# Patient Record
Sex: FEMALE | Race: WHITE | URBAN - METROPOLITAN AREA
[De-identification: names, ages, dates, MRNs, and addresses within clinical notes are randomized per-mention and may not be internally consistent; named-entity substitution may affect disease eponyms.]

---

## 2023-05-03 RX ORDER — CEFAZOLIN SODIUM 1 G/3ML
1 INJECTION, POWDER, FOR SOLUTION INTRAMUSCULAR; INTRAVENOUS ONCE
Status: CANCELLED | OUTPATIENT
Start: 2023-05-03 | End: 2023-05-03

## 2023-06-21 ENCOUNTER — ANESTHESIA (OUTPATIENT)
Dept: SURGERY | Facility: HOSPITAL | Age: 28
End: 2023-06-21
Payer: COMMERCIAL

## 2023-06-21 ENCOUNTER — HOSPITAL ENCOUNTER (OUTPATIENT)
Facility: HOSPITAL | Age: 28
Setting detail: HOSPITAL OUTPATIENT SURGERY
Discharge: HOME | End: 2023-06-21
Attending: OBSTETRICS & GYNECOLOGY | Admitting: OBSTETRICS & GYNECOLOGY
Payer: COMMERCIAL

## 2023-06-21 ENCOUNTER — ANESTHESIA EVENT (OUTPATIENT)
Dept: SURGERY | Facility: HOSPITAL | Age: 28
End: 2023-06-21
Payer: COMMERCIAL

## 2023-06-21 VITALS
RESPIRATION RATE: 18 BRPM | OXYGEN SATURATION: 100 % | DIASTOLIC BLOOD PRESSURE: 69 MMHG | SYSTOLIC BLOOD PRESSURE: 121 MMHG | HEART RATE: 57 BPM | HEIGHT: 66 IN | BODY MASS INDEX: 28.74 KG/M2 | TEMPERATURE: 98 F | WEIGHT: 178.81 LBS

## 2023-06-21 PROBLEM — N83.202 CYST OF LEFT OVARY: Status: ACTIVE | Noted: 2023-06-21

## 2023-06-21 PROCEDURE — 0UT14ZZ RESECTION OF LEFT OVARY, PERCUTANEOUS ENDOSCOPIC APPROACH: ICD-10-PCS | Performed by: OBSTETRICS & GYNECOLOGY

## 2023-06-21 PROCEDURE — 0UT64ZZ RESECTION OF LEFT FALLOPIAN TUBE, PERCUTANEOUS ENDOSCOPIC APPROACH: ICD-10-PCS | Performed by: OBSTETRICS & GYNECOLOGY

## 2023-06-21 RX ORDER — DIPHENHYDRAMINE HYDROCHLORIDE 50 MG/ML
12.5 INJECTION INTRAMUSCULAR; INTRAVENOUS AS NEEDED
Status: DISCONTINUED | OUTPATIENT
Start: 2023-06-21 | End: 2023-06-21

## 2023-06-21 RX ORDER — ACETAMINOPHEN 500 MG
1000 TABLET ORAL ONCE
Status: DISCONTINUED | OUTPATIENT
Start: 2023-06-21 | End: 2023-06-21 | Stop reason: HOSPADM

## 2023-06-21 RX ORDER — BUPIVACAINE HYDROCHLORIDE 5 MG/ML
INJECTION, SOLUTION EPIDURAL; INTRACAUDAL AS NEEDED
Status: DISCONTINUED | OUTPATIENT
Start: 2023-06-21 | End: 2023-06-21 | Stop reason: HOSPADM

## 2023-06-21 RX ORDER — NEOSTIGMINE METHYLSULFATE 1 MG/ML
INJECTION, SOLUTION INTRAVENOUS AS NEEDED
Status: DISCONTINUED | OUTPATIENT
Start: 2023-06-21 | End: 2023-06-21 | Stop reason: SURG

## 2023-06-21 RX ORDER — MIDAZOLAM HYDROCHLORIDE 1 MG/ML
INJECTION INTRAMUSCULAR; INTRAVENOUS AS NEEDED
Status: DISCONTINUED | OUTPATIENT
Start: 2023-06-21 | End: 2023-06-21 | Stop reason: SURG

## 2023-06-21 RX ORDER — HYDROCODONE BITARTRATE AND ACETAMINOPHEN 5; 325 MG/1; MG/1
1 TABLET ORAL ONCE AS NEEDED
Status: DISCONTINUED | OUTPATIENT
Start: 2023-06-21 | End: 2023-06-21

## 2023-06-21 RX ORDER — PROCHLORPERAZINE EDISYLATE 5 MG/ML
5 INJECTION INTRAMUSCULAR; INTRAVENOUS EVERY 8 HOURS PRN
Status: DISCONTINUED | OUTPATIENT
Start: 2023-06-21 | End: 2023-06-21

## 2023-06-21 RX ORDER — NALOXONE HYDROCHLORIDE 0.4 MG/ML
80 INJECTION, SOLUTION INTRAMUSCULAR; INTRAVENOUS; SUBCUTANEOUS AS NEEDED
Status: DISCONTINUED | OUTPATIENT
Start: 2023-06-21 | End: 2023-06-21

## 2023-06-21 RX ORDER — HYDROCODONE BITARTRATE AND ACETAMINOPHEN 5; 325 MG/1; MG/1
2 TABLET ORAL ONCE AS NEEDED
Status: DISCONTINUED | OUTPATIENT
Start: 2023-06-21 | End: 2023-06-21

## 2023-06-21 RX ORDER — HYDROCODONE BITARTRATE AND ACETAMINOPHEN 5; 325 MG/1; MG/1
1-2 TABLET ORAL EVERY 4 HOURS PRN
Qty: 15 TABLET | Refills: 0 | Status: SHIPPED | OUTPATIENT
Start: 2023-06-21

## 2023-06-21 RX ORDER — KETOROLAC TROMETHAMINE 30 MG/ML
INJECTION, SOLUTION INTRAMUSCULAR; INTRAVENOUS AS NEEDED
Status: DISCONTINUED | OUTPATIENT
Start: 2023-06-21 | End: 2023-06-21 | Stop reason: SURG

## 2023-06-21 RX ORDER — CEFAZOLIN SODIUM/WATER 2 G/20 ML
2 SYRINGE (ML) INTRAVENOUS ONCE
Status: COMPLETED | OUTPATIENT
Start: 2023-06-21 | End: 2023-06-21

## 2023-06-21 RX ORDER — HYDROMORPHONE HYDROCHLORIDE 1 MG/ML
0.2 INJECTION, SOLUTION INTRAMUSCULAR; INTRAVENOUS; SUBCUTANEOUS EVERY 5 MIN PRN
Status: DISCONTINUED | OUTPATIENT
Start: 2023-06-21 | End: 2023-06-21

## 2023-06-21 RX ORDER — LIDOCAINE HYDROCHLORIDE 10 MG/ML
INJECTION, SOLUTION EPIDURAL; INFILTRATION; INTRACAUDAL; PERINEURAL AS NEEDED
Status: DISCONTINUED | OUTPATIENT
Start: 2023-06-21 | End: 2023-06-21 | Stop reason: SURG

## 2023-06-21 RX ORDER — SODIUM CHLORIDE, SODIUM LACTATE, POTASSIUM CHLORIDE, CALCIUM CHLORIDE 600; 310; 30; 20 MG/100ML; MG/100ML; MG/100ML; MG/100ML
INJECTION, SOLUTION INTRAVENOUS CONTINUOUS
Status: DISCONTINUED | OUTPATIENT
Start: 2023-06-21 | End: 2023-06-21

## 2023-06-21 RX ORDER — DEXAMETHASONE SODIUM PHOSPHATE 4 MG/ML
VIAL (ML) INJECTION AS NEEDED
Status: DISCONTINUED | OUTPATIENT
Start: 2023-06-21 | End: 2023-06-21 | Stop reason: SURG

## 2023-06-21 RX ORDER — HYDROMORPHONE HYDROCHLORIDE 1 MG/ML
0.6 INJECTION, SOLUTION INTRAMUSCULAR; INTRAVENOUS; SUBCUTANEOUS EVERY 5 MIN PRN
Status: DISCONTINUED | OUTPATIENT
Start: 2023-06-21 | End: 2023-06-21

## 2023-06-21 RX ORDER — SCOLOPAMINE TRANSDERMAL SYSTEM 1 MG/1
1 PATCH, EXTENDED RELEASE TRANSDERMAL ONCE
Status: DISCONTINUED | OUTPATIENT
Start: 2023-06-21 | End: 2023-06-21

## 2023-06-21 RX ORDER — GLYCOPYRROLATE 0.2 MG/ML
INJECTION, SOLUTION INTRAMUSCULAR; INTRAVENOUS AS NEEDED
Status: DISCONTINUED | OUTPATIENT
Start: 2023-06-21 | End: 2023-06-21 | Stop reason: SURG

## 2023-06-21 RX ORDER — HYDROMORPHONE HYDROCHLORIDE 1 MG/ML
0.4 INJECTION, SOLUTION INTRAMUSCULAR; INTRAVENOUS; SUBCUTANEOUS EVERY 5 MIN PRN
Status: DISCONTINUED | OUTPATIENT
Start: 2023-06-21 | End: 2023-06-21

## 2023-06-21 RX ORDER — ONDANSETRON 2 MG/ML
INJECTION INTRAMUSCULAR; INTRAVENOUS
Status: COMPLETED
Start: 2023-06-21 | End: 2023-06-21

## 2023-06-21 RX ORDER — ACETAMINOPHEN 500 MG
1000 TABLET ORAL ONCE AS NEEDED
Status: DISCONTINUED | OUTPATIENT
Start: 2023-06-21 | End: 2023-06-21

## 2023-06-21 RX ORDER — ROCURONIUM BROMIDE 10 MG/ML
INJECTION, SOLUTION INTRAVENOUS AS NEEDED
Status: DISCONTINUED | OUTPATIENT
Start: 2023-06-21 | End: 2023-06-21 | Stop reason: SURG

## 2023-06-21 RX ORDER — ONDANSETRON 2 MG/ML
4 INJECTION INTRAMUSCULAR; INTRAVENOUS EVERY 6 HOURS PRN
Status: DISCONTINUED | OUTPATIENT
Start: 2023-06-21 | End: 2023-06-21

## 2023-06-21 RX ADMIN — LIDOCAINE HYDROCHLORIDE 50 MG: 10 INJECTION, SOLUTION EPIDURAL; INFILTRATION; INTRACAUDAL; PERINEURAL at 07:45:00

## 2023-06-21 RX ADMIN — NEOSTIGMINE METHYLSULFATE 3 MG: 1 INJECTION, SOLUTION INTRAVENOUS at 09:03:00

## 2023-06-21 RX ADMIN — SODIUM CHLORIDE, SODIUM LACTATE, POTASSIUM CHLORIDE, CALCIUM CHLORIDE: 600; 310; 30; 20 INJECTION, SOLUTION INTRAVENOUS at 07:38:00

## 2023-06-21 RX ADMIN — MIDAZOLAM HYDROCHLORIDE 2 MG: 1 INJECTION INTRAMUSCULAR; INTRAVENOUS at 07:38:00

## 2023-06-21 RX ADMIN — ROCURONIUM BROMIDE 40 MG: 10 INJECTION, SOLUTION INTRAVENOUS at 07:45:00

## 2023-06-21 RX ADMIN — GLYCOPYRROLATE 0.15 MG: 0.2 INJECTION, SOLUTION INTRAMUSCULAR; INTRAVENOUS at 08:12:00

## 2023-06-21 RX ADMIN — KETOROLAC TROMETHAMINE 30 MG: 30 INJECTION, SOLUTION INTRAMUSCULAR; INTRAVENOUS at 08:50:00

## 2023-06-21 RX ADMIN — DEXAMETHASONE SODIUM PHOSPHATE 4 MG: 4 MG/ML VIAL (ML) INJECTION at 07:45:00

## 2023-06-21 RX ADMIN — SODIUM CHLORIDE, SODIUM LACTATE, POTASSIUM CHLORIDE, CALCIUM CHLORIDE: 600; 310; 30; 20 INJECTION, SOLUTION INTRAVENOUS at 08:57:00

## 2023-06-21 RX ADMIN — CEFAZOLIN SODIUM/WATER 2 G: 2 G/20 ML SYRINGE (ML) INTRAVENOUS at 07:52:00

## 2023-06-21 RX ADMIN — GLYCOPYRROLATE 0.4 MG: 0.2 INJECTION, SOLUTION INTRAMUSCULAR; INTRAVENOUS at 09:03:00

## 2023-06-21 RX ADMIN — ROCURONIUM BROMIDE 10 MG: 10 INJECTION, SOLUTION INTRAVENOUS at 08:30:00

## 2023-06-21 NOTE — OR NURSING
Patient up to bathroom with no issues. Upon discharge parient had no pain or nausea.  Scant discharge on peripad

## 2023-06-21 NOTE — ANESTHESIA PROCEDURE NOTES
Airway  Date/Time: 6/21/2023 7:49 AM  Urgency: elective    Airway not difficult    General Information and Staff    Patient location during procedure: OR  Anesthesiologist: Vidal Zacarias MD  Resident/CRNA: Elder Fang CRNA  Performed: CRNA   Performed by: Elder Fang CRNA  Authorized by: Vidal Zacarias MD      Indications and Patient Condition  Indications for airway management: anesthesia  Spontaneous Ventilation: absent  Sedation level: deep  Preoxygenated: yes  Patient position: sniffing  Mask difficulty assessment: 1 - vent by mask    Final Airway Details  Final airway type: endotracheal airway      Successful airway: ETT  Cuffed: yes   Successful intubation technique: direct laryngoscopy  Endotracheal tube insertion site: oral  Blade: Jerry  Blade size: #3  ETT size (mm): 7.0    Cormack-Lehane Classification: grade I - full view of glottis  Placement verified by: capnometry   Measured from: lips  ETT to lips (cm): 21  Number of attempts at approach: 1 Patient called back stating that she has been laying there for about 1 1/2 hours and can not fall asleep.  Patient states she keeps getting thoughts entering her head like what if she didn't go to the hospital that night, what if she got the morphine, what if she didn't use the penicillin, what if she stayed on the penicillin.  Patient states she mind can not stop with all of the what if's.  She doesn't know how to let it go.

## 2023-06-21 NOTE — DISCHARGE SUMMARY
[facility]  Discharge Summary    Sisi De Leon Patient Status:  Hospital Outpatient Surgery    12/15/1995 MRN [id number]   Location [facility] Attending Alex Guerra MD   Hosp Day # 0 PCP Kim Flores MD     Date of Admission: 2023    Date of Discharge: 2023    Admitting Diagnosis: LEFT ADNEXAL MASS    Discharge Diagnosis: Patient Active Problem List:     Migraine headache without aura     Cyst of left ovary      Reason for Admission: Left ovarian cyst            Hospital Course: unremarkable        Procedures: Laparoscopy with  LSO    Complications: none    Disposition: Final discharge disposition not confirmed    Discharge Condition: Stable  Diet:  General  Activity:  As tolerated    Discharge Medications: Current Discharge Medication List    START taking these medications    HYDROcodone-acetaminophen 5-325 MG Oral Tab  Take 1-2 tablets by mouth every 4 (four) hours as needed for Pain. Qty: 15 tablet Refills: 0      CONTINUE these medications which have NOT CHANGED    drospirenone-ethinyl estradiol 3-0.03 MG Oral Tab  Take 1 tablet by mouth daily.   Qty: 84 tablet Refills: 3  Associated Diagnoses:Surveillance for birth control, oral contraceptives                      Narinder Olson MD  2023  9:20 AM
You can access the FollowMyHealth Patient Portal offered by [facility] by registering at the following website: [email or web address]. By joining [facility]’s FollowMyHealth portal, you will also be able to view your health information using other applications (apps) compatible with our system.

## 2024-12-19 ENCOUNTER — EMPLOYEE HEALTH (OUTPATIENT)
Dept: OTHER | Facility: HOSPITAL | Age: 29
End: 2024-12-19
Attending: PREVENTIVE MEDICINE

## 2024-12-19 DIAGNOSIS — Z11.1 SCREENING-PULMONARY TB: Primary | ICD-10-CM

## 2024-12-19 DIAGNOSIS — Z01.84 IMMUNITY STATUS TESTING: ICD-10-CM

## 2024-12-19 PROCEDURE — 86787 VARICELLA-ZOSTER ANTIBODY: CPT

## 2024-12-19 PROCEDURE — 86480 TB TEST CELL IMMUN MEASURE: CPT

## 2024-12-20 LAB — VZV IGG SER IA-ACNC: 1.1 (ref 1–?)

## 2024-12-21 LAB
M TB IFN-G CD4+ T-CELLS BLD-ACNC: 0.01 IU/ML
M TB TUBERC IFN-G BLD QL: NEGATIVE
M TB TUBERC IGNF/MITOGEN IGNF CONTROL: >10 IU/ML
QFT TB1 AG MINUS NIL: 0 IU/ML
QFT TB2 AG MINUS NIL: 0 IU/ML

## (undated) DEVICE — VIOLET BRAIDED (POLYGLACTIN 910), SYNTHETIC ABSORBABLE SUTURE: Brand: COATED VICRYL

## (undated) DEVICE — [HIGH FLOW INSUFFLATOR,  DO NOT USE IF PACKAGE IS DAMAGED,  KEEP DRY,  KEEP AWAY FROM SUNLIGHT,  PROTECT FROM HEAT AND RADIOACTIVE SOURCES.]: Brand: PNEUMOSURE

## (undated) DEVICE — TROCARS: Brand: KII® OPTICAL ACCESS SYSTEM

## (undated) DEVICE — SUT VICRYL 0 UR-6 J603H

## (undated) DEVICE — LAPCLINCH GRASPER TIP, DISPOSABLE: Brand: RENEW

## (undated) DEVICE — POUCH SPECIMEN WIRE 6X3 250ML

## (undated) DEVICE — DERMABOND CLOSURE 0.7ML TOPICL

## (undated) DEVICE — TRADITIONAL MARYLAND DISSECTOR TIP, DISPOSABLE: Brand: RENEW

## (undated) DEVICE — MEDI-VAC SUCTION HANDLE REGULAR CAPACITY: Brand: CARDINAL HEALTH

## (undated) DEVICE — SOLUTION ANTIFOG W/ SPONGE

## (undated) DEVICE — STERILE POLYISOPRENE POWDER-FREE SURGICAL GLOVES: Brand: PROTEXIS

## (undated) DEVICE — 40580 - THE PINK PAD - ADVANCED TRENDELENBURG POSITIONING KIT: Brand: 40580 - THE PINK PAD - ADVANCED TRENDELENBURG POSITIONING KIT

## (undated) DEVICE — ENDOCUT SCISSOR TIP, DISPOSABLE: Brand: RENEW

## (undated) DEVICE — ANCHOR TISSUE RETRIEVAL SYSTEM, BAG SIZE 1200 ML, PORT SIZE 15 MM: Brand: ANCHOR TISSUE RETRIEVAL SYSTEM

## (undated) DEVICE — ENSEAL X1 TISSUE SEALER, CURVED JAW, 37 CM SHAFT LENGTH: Brand: ENSEAL

## (undated) DEVICE — SUT MONOCRYL 4-0 PS-2 Y496G

## (undated) DEVICE — TROCAR: Brand: KII FIOS FIRST ENTRY

## (undated) DEVICE — PLUMEPORT ACTIV LAPAROSCOPIC SMOKE FILTRATION DEVICE: Brand: PLUMEPORT ACTIVE

## (undated) DEVICE — LAPAROVUE VISIBILITY SYSTEM LAPAROSCOPIC SOLUTIONS: Brand: LAPAROVUE

## (undated) DEVICE — SLEEVE KENDALL SCD EXPRESS MED

## (undated) DEVICE — TROCAR: Brand: KII SLEEVE

## (undated) DEVICE — BAG DRAIN INFECTION CNTRL 2000

## (undated) DEVICE — INSUFFLATION NEEDLE TO ESTABLISH PNEUMOPERITONEUM.: Brand: INSUFFLATION NEEDLE

## (undated) DEVICE — GYN LAP/ROBOTIC: Brand: MEDLINE INDUSTRIES, INC.

## (undated) DEVICE — SOL NACL IRRIG 0.9% 1000ML BTL